# Patient Record
Sex: FEMALE | NOT HISPANIC OR LATINO | Employment: FULL TIME | URBAN - METROPOLITAN AREA
[De-identification: names, ages, dates, MRNs, and addresses within clinical notes are randomized per-mention and may not be internally consistent; named-entity substitution may affect disease eponyms.]

---

## 2024-06-20 ENCOUNTER — VIRTUAL VISIT (OUTPATIENT)
Dept: OBGYN | Facility: CLINIC | Age: 39
End: 2024-06-20

## 2024-06-20 VITALS — HEIGHT: 60 IN

## 2024-06-20 DIAGNOSIS — O09.519 SUPERVISION OF HIGH-RISK PREGNANCY OF ELDERLY PRIMIGRAVIDA: Primary | ICD-10-CM

## 2024-06-20 PROCEDURE — 99207 PR NO CHARGE NURSE ONLY: CPT | Mod: 93

## 2024-06-20 RX ORDER — PNV NO.95/FERROUS FUM/FOLIC AC 28MG-0.8MG
TABLET ORAL
COMMUNITY

## 2024-06-20 NOTE — NURSING NOTE
NPN nurse visit done over the phone. Pt will be given NPN folder and book at her upcoming appt.   Discussed optional screening available to assess chromosomal anomalies. Questions answered. Pt advised to call the clinic if she has any questions or concerns related to her pregnancy. Prenatal labs will be obtained at her upcoming appt. New prenatal visit scheduled on 7/24 with Dr. Hernández.    7w0d    Menstrual cycles: regular    Last pap: unsure. Doesn't think that she has had one.        Patient supplied answers from flow sheet for:  Prenatal OB Questionnaire.  Past Medical History  Have you ever recieved care for your mental health? : No  Have you ever been in a major accident or suffered serious trauma?: No  Within the last year, has anyone hit, slapped, kicked or otherwise hurt you?: No  In the last year, has anyone forced you to have sex when you didn't want to?: No    Past Medical History 2   Have you ever received a blood transfusion?: No  Would you accept a blood transfusion if was medically recommended?: Yes  Does anyone in your home smoke?: No   Is your blood type Rh negative?: No  Have you ever ?: No  Have you been hospitalized for a nonsurgical reason excluding normal delivery?: No  Have you ever had an abnormal pap smear?: No    Past Medical History (Continued)  Do you have a history of abnormalities of the uterus?: No  Did your mother take AUSTIN or any other hormones when she was pregnant with you?: No  Do you have any other problems we have not asked about which you feel may be important to this pregnancy?: No    Alexandra COLLINS RN

## 2024-07-02 LAB
ABO/RH(D): ABNORMAL
ANTIBODY SCREEN: POSITIVE
SPECIMEN EXPIRATION DATE: ABNORMAL

## 2024-07-03 ENCOUNTER — ANCILLARY PROCEDURE (OUTPATIENT)
Dept: ULTRASOUND IMAGING | Facility: CLINIC | Age: 39
End: 2024-07-03
Attending: OBSTETRICS & GYNECOLOGY
Payer: COMMERCIAL

## 2024-07-03 ENCOUNTER — LAB (OUTPATIENT)
Dept: LAB | Facility: CLINIC | Age: 39
End: 2024-07-03
Payer: COMMERCIAL

## 2024-07-03 DIAGNOSIS — O09.519 SUPERVISION OF HIGH-RISK PREGNANCY OF ELDERLY PRIMIGRAVIDA: ICD-10-CM

## 2024-07-03 LAB
ERYTHROCYTE [DISTWIDTH] IN BLOOD BY AUTOMATED COUNT: 12.5 % (ref 10–15)
HCT VFR BLD AUTO: 38.2 % (ref 35–47)
HGB BLD-MCNC: 12.9 G/DL (ref 11.7–15.7)
MCH RBC QN AUTO: 32 PG (ref 26.5–33)
MCHC RBC AUTO-ENTMCNC: 33.8 G/DL (ref 31.5–36.5)
MCV RBC AUTO: 95 FL (ref 78–100)
PLATELET # BLD AUTO: 288 10E3/UL (ref 150–450)
RBC # BLD AUTO: 4.03 10E6/UL (ref 3.8–5.2)
WBC # BLD AUTO: 7.5 10E3/UL (ref 4–11)

## 2024-07-03 PROCEDURE — 36415 COLL VENOUS BLD VENIPUNCTURE: CPT

## 2024-07-03 PROCEDURE — 86850 RBC ANTIBODY SCREEN: CPT

## 2024-07-03 PROCEDURE — 86803 HEPATITIS C AB TEST: CPT

## 2024-07-03 PROCEDURE — 86905 BLOOD TYPING RBC ANTIGENS: CPT

## 2024-07-03 PROCEDURE — 86901 BLOOD TYPING SEROLOGIC RH(D): CPT

## 2024-07-03 PROCEDURE — 86870 RBC ANTIBODY IDENTIFICATION: CPT

## 2024-07-03 PROCEDURE — 76801 OB US < 14 WKS SINGLE FETUS: CPT | Performed by: OBSTETRICS & GYNECOLOGY

## 2024-07-03 PROCEDURE — 87086 URINE CULTURE/COLONY COUNT: CPT

## 2024-07-03 PROCEDURE — 86900 BLOOD TYPING SEROLOGIC ABO: CPT

## 2024-07-03 PROCEDURE — 86762 RUBELLA ANTIBODY: CPT

## 2024-07-03 PROCEDURE — 87389 HIV-1 AG W/HIV-1&-2 AB AG IA: CPT

## 2024-07-03 PROCEDURE — 85027 COMPLETE CBC AUTOMATED: CPT

## 2024-07-03 PROCEDURE — 87340 HEPATITIS B SURFACE AG IA: CPT

## 2024-07-03 PROCEDURE — 86780 TREPONEMA PALLIDUM: CPT

## 2024-07-04 LAB
ANTIBODY ID: NORMAL
HBV SURFACE AG SERPL QL IA: NONREACTIVE
HCV AB SERPL QL IA: NONREACTIVE
HIV 1+2 AB+HIV1 P24 AG SERPL QL IA: NONREACTIVE
M AG RBC QL: NEGATIVE
RUBV IGG SERPL QL IA: 10.7 INDEX
RUBV IGG SERPL QL IA: POSITIVE
SPECIMEN EXPIRATION DATE: NORMAL
SPECIMEN EXPIRATION DATE: NORMAL
T PALLIDUM AB SER QL: NONREACTIVE

## 2024-07-05 LAB
BACTERIA UR CULT: NO GROWTH
BLOOD BANK CHART COMMENT: NORMAL
SPECIMEN EXPIRATION DATE: NORMAL

## 2024-07-24 ENCOUNTER — PRENATAL OFFICE VISIT (OUTPATIENT)
Dept: OBGYN | Facility: CLINIC | Age: 39
End: 2024-07-24
Payer: COMMERCIAL

## 2024-07-24 ENCOUNTER — TRANSCRIBE ORDERS (OUTPATIENT)
Dept: MATERNAL FETAL MEDICINE | Facility: CLINIC | Age: 39
End: 2024-07-24

## 2024-07-24 VITALS — DIASTOLIC BLOOD PRESSURE: 66 MMHG | BODY MASS INDEX: 28.86 KG/M2 | SYSTOLIC BLOOD PRESSURE: 104 MMHG | WEIGHT: 147 LBS

## 2024-07-24 DIAGNOSIS — O09.521 MULTIGRAVIDA OF ADVANCED MATERNAL AGE IN FIRST TRIMESTER: Primary | ICD-10-CM

## 2024-07-24 DIAGNOSIS — Z34.01 ENCOUNTER FOR SUPERVISION OF NORMAL FIRST PREGNANCY IN FIRST TRIMESTER: ICD-10-CM

## 2024-07-24 DIAGNOSIS — O26.90 PREGNANCY RELATED CONDITION, ANTEPARTUM: Primary | ICD-10-CM

## 2024-07-24 PROCEDURE — G0145 SCR C/V CYTO,THINLAYER,RESCR: HCPCS | Performed by: OBSTETRICS & GYNECOLOGY

## 2024-07-24 PROCEDURE — 36415 COLL VENOUS BLD VENIPUNCTURE: CPT | Performed by: OBSTETRICS & GYNECOLOGY

## 2024-07-24 PROCEDURE — 87624 HPV HI-RISK TYP POOLED RSLT: CPT | Performed by: OBSTETRICS & GYNECOLOGY

## 2024-07-24 PROCEDURE — 99207 PR FIRST OB VISIT: CPT | Performed by: OBSTETRICS & GYNECOLOGY

## 2024-07-24 PROCEDURE — 87491 CHLMYD TRACH DNA AMP PROBE: CPT | Performed by: OBSTETRICS & GYNECOLOGY

## 2024-07-24 PROCEDURE — 87591 N.GONORRHOEAE DNA AMP PROB: CPT | Performed by: OBSTETRICS & GYNECOLOGY

## 2024-07-24 NOTE — PROGRESS NOTES
New OB Visit  Cherry Hernandez   2024   11w6d   Needs [facility]  or spouse present as he was today.  Spouse is RN at [employer]    Subjective: Cherry Hernandez 39 year old  at 11w6d dated by LMP, early ultrasound here today for initial OB visit.Estimated Date of Delivery: 2025. Patient reports being tired. Denies cramping and vaginal spotting.     Gyn History:   Menses: LMP: Patient's last menstrual period was 2024 (exact date). frequency: q month  Sexually transmitted disease history: none.    Occupation:   Exercise: active  Diet: as tolerated  H/o Chicken Pox or Varicella Vaccination: Yes    no prior history of hypertension, DM, asthma.    History of GDM: No   Hx PTL : No   History of HTN in pregnancy: No   Shoulder dystocia: No   Vacuum Extraction: No   PPH: No   3rd of 4th degree laceration: No   Other complications: No    Since her last LMP she denies use of alcohol, tobacco and street drugs.    OBhx: never pregnant  OB History    Para Term  AB Living   1 0 0 0 0 0   SAB IAB Ectopic Multiple Live Births   0 0 0 0 0      # Outcome Date GA Lbr Florentino/2nd Weight Sex Type Anes PTL Lv   1 Current                ROS: Ten point review of systems was reviewed and negative except the above.    HISTORY:  History reviewed. No pertinent past medical history.  Past Surgical History:   Procedure Laterality Date    BREAST MASS EXCISION Right      Family History   Problem Relation Age of Onset    Hypertension Mother     Diabetes Father     No Known Problems Sister     Alzheimer Disease Maternal Grandmother      Social History     Socioeconomic History    Marital status:      Spouse name: Roderick Salazar    Number of children: None    Years of education: None    Highest education level: None   Tobacco Use    Smoking status: Never    Smokeless tobacco: Never   Substance and Sexual Activity    Alcohol use: Never    Drug use: Never    Sexual activity: Yes     Partners: Male     Current  Outpatient Medications   Medication Sig Dispense Refill    Prenatal Vit-Fe Fumarate-FA (PRENATAL VITAMIN) 27-0.8 MG TABS        No current facility-administered medications for this visit.     Allergies   Allergen Reactions    Blood Transfusion Related (Informational Only)      Patient has a history of a clinically significant antibody against RBC antigens.  A delay in compatible RBCs may occur.       Past medical, surgical, social and family history were reviewed and updated in EPIC.      EXAM:  /66   Wt 66.7 kg (147 lb)   LMP 2024 (Exact Date)   BMI 28.86 kg/m       Gen:  no acute distress, comfortable  HENT: No scleral injection or icterus  CV: Regular rate and rhythm, no murmur  Resp: CTAB, Normal work of breathing, no cough  GI: Abdomen soft, non-tender. No masses, organomegaly  Skin: No suspicious lesions or rashes  Psychiatric: mentation appears normal and affect bright   Pelvis: External genitalia within normal limits. Urethra is without lesion. Bladder is nontender.    On speculum exam, cervix is without lesion and vagina is normal without lesion or discharge. Pap smear obtained without incident. GC/Chlamydia PCR obtained  +FHT present 160      Recent Labs   Lab Test 24  1242   AS Positive*     Rhogam not indicated     Recent Labs   Lab Test 24  1242   HEPBANG Nonreactive   HIAGAB Nonreactive   RUQIGG Positive       Treponemal antibody neg    CBC RESULTS:   Recent Labs   Lab Test 24  1242   WBC 7.5   RBC 4.03   HGB 12.9   HCT 38.2   MCV 95   MCH 32.0   MCHC 33.8   RDW 12.5          ASSESSMENT:  39 year old  at 11w6d dated by LMP and early U/S here for NOB visit.    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 She has no questions.  2) EDUCATION : RECOMMENDED WEIGHT GAIN: 25-35 lbs given Body mass  index is 28.86 kg/m ..   - Instructed on best evidence for: healthy diet and foods to avoid; exercise and activity during pregnancy; and maintenance of a generally healthy lifestyle. Reviewed early pregnancy education, provider coverage, labor and delivery, and prenatal visits.  Discussed the harms, benefits, side effects and alternative therapies for current prescribed and OTC medications.  - recommend PNV  3) Discussed options for screening for chromosomal anomalies, including first screen, noninvasive prenatal testing, CVS/amniocentesis, quad screen, and ultrasound at 18-20 weeks. She is electing noninvasive prenatal testing performed today.  4) MFM referral secondary to AMA and to confirm the clinical insignificance of the anti-M antibody    Follow up in 4 weeks. She is encouraged to call sooner with questions or concerns.    Wojciech Hernández MD   Obstetrics and Gynecology

## 2024-07-24 NOTE — NURSING NOTE
"Chief Complaint   Patient presents with    Prenatal Care     NPN provider visit   11w6d  In the presence of video  # [id number],  waiver form signed.     initial /66   Wt 66.7 kg (147 lb)   LMP 05/02/2024 (Exact Date)   BMI 28.86 kg/m   Estimated body mass index is 28.86 kg/m  as calculated from the following:    Height as of 6/20/24: 1.52 m (4' 11.84\").    Weight as of this encounter: 66.7 kg (147 lb).  BP completed using cuff size regular.  Connie Child CMA    "

## 2024-07-25 LAB
C TRACH DNA SPEC QL NAA+PROBE: NEGATIVE
HPV HR 12 DNA CVX QL NAA+PROBE: NEGATIVE
HPV16 DNA CVX QL NAA+PROBE: NEGATIVE
HPV18 DNA CVX QL NAA+PROBE: NEGATIVE
HUMAN PAPILLOMA VIRUS FINAL DIAGNOSIS: NORMAL
N GONORRHOEA DNA SPEC QL NAA+PROBE: NEGATIVE

## 2024-07-28 ENCOUNTER — HEALTH MAINTENANCE LETTER (OUTPATIENT)
Age: 39
End: 2024-07-28

## 2024-07-30 LAB
BKR LAB AP GYN ADEQUACY: NORMAL
BKR LAB AP GYN INTERPRETATION: NORMAL
BKR LAB AP LMP: NORMAL
BKR LAB AP PREVIOUS ABNORMAL: NORMAL
PATH REPORT.COMMENTS IMP SPEC: NORMAL
PATH REPORT.COMMENTS IMP SPEC: NORMAL
PATH REPORT.RELEVANT HX SPEC: NORMAL

## 2024-07-31 ENCOUNTER — PATIENT OUTREACH (OUTPATIENT)
Dept: OBGYN | Facility: CLINIC | Age: 39
End: 2024-07-31
Payer: COMMERCIAL

## 2024-07-31 PROBLEM — R87.810 CERVICAL HIGH RISK HPV (HUMAN PAPILLOMAVIRUS) TEST POSITIVE: Status: ACTIVE | Noted: 2024-07-31

## 2024-08-13 LAB — SCANNED LAB RESULT: NORMAL

## 2024-08-21 ENCOUNTER — PRENATAL OFFICE VISIT (OUTPATIENT)
Dept: OBGYN | Facility: CLINIC | Age: 39
End: 2024-08-21
Payer: COMMERCIAL

## 2024-08-21 VITALS — DIASTOLIC BLOOD PRESSURE: 62 MMHG | WEIGHT: 150 LBS | BODY MASS INDEX: 29.45 KG/M2 | SYSTOLIC BLOOD PRESSURE: 102 MMHG

## 2024-08-21 DIAGNOSIS — Z34.02 ENCOUNTER FOR SUPERVISION OF NORMAL FIRST PREGNANCY IN SECOND TRIMESTER: Primary | ICD-10-CM

## 2024-08-21 PROCEDURE — 99207 PR PRENATAL VISIT: CPT | Performed by: OBSTETRICS & GYNECOLOGY

## 2024-08-21 NOTE — PROGRESS NOTES
39-year-old [place] female  1 para 0 at 15 weeks 6 days.  Spouse present and helping with interpretation today although patient's English is satisfactory.  Has an appointment in [facility] on 918 for level 2 ultrasound and discussion of anti-M antibody which appears to be clinically insignificant.  Not yet perceiving fetal movement.  Return to clinic 4 weeks

## 2024-09-06 ENCOUNTER — TELEPHONE (OUTPATIENT)
Dept: OBGYN | Facility: CLINIC | Age: 39
End: 2024-09-06

## 2024-09-06 ENCOUNTER — ANCILLARY PROCEDURE (OUTPATIENT)
Dept: ULTRASOUND IMAGING | Facility: CLINIC | Age: 39
End: 2024-09-06
Attending: OBSTETRICS & GYNECOLOGY
Payer: COMMERCIAL

## 2024-09-06 ENCOUNTER — PRENATAL OFFICE VISIT (OUTPATIENT)
Dept: OBGYN | Facility: CLINIC | Age: 39
End: 2024-09-06
Payer: COMMERCIAL

## 2024-09-06 VITALS — BODY MASS INDEX: 30.23 KG/M2 | DIASTOLIC BLOOD PRESSURE: 64 MMHG | SYSTOLIC BLOOD PRESSURE: 122 MMHG | WEIGHT: 154 LBS

## 2024-09-06 DIAGNOSIS — O46.92 SECOND TRIMESTER BLEEDING: Primary | ICD-10-CM

## 2024-09-06 DIAGNOSIS — B96.89 BV (BACTERIAL VAGINOSIS): ICD-10-CM

## 2024-09-06 DIAGNOSIS — N93.9 VAGINAL BLEEDING: Primary | ICD-10-CM

## 2024-09-06 DIAGNOSIS — N93.9 VAGINAL BLEEDING: ICD-10-CM

## 2024-09-06 DIAGNOSIS — N76.0 BV (BACTERIAL VAGINOSIS): ICD-10-CM

## 2024-09-06 LAB
CLUE CELLS: PRESENT
TRICHOMONAS, WET PREP: ABNORMAL
WBC'S/HIGH POWER FIELD, WET PREP: ABNORMAL
YEAST, WET PREP: ABNORMAL

## 2024-09-06 PROCEDURE — 87210 SMEAR WET MOUNT SALINE/INK: CPT | Performed by: OBSTETRICS & GYNECOLOGY

## 2024-09-06 PROCEDURE — 76817 TRANSVAGINAL US OBSTETRIC: CPT | Performed by: OBSTETRICS & GYNECOLOGY

## 2024-09-06 PROCEDURE — 99214 OFFICE O/P EST MOD 30 MIN: CPT | Performed by: OBSTETRICS & GYNECOLOGY

## 2024-09-06 PROCEDURE — 87491 CHLMYD TRACH DNA AMP PROBE: CPT | Performed by: OBSTETRICS & GYNECOLOGY

## 2024-09-06 PROCEDURE — 87591 N.GONORRHOEAE DNA AMP PROB: CPT | Performed by: OBSTETRICS & GYNECOLOGY

## 2024-09-06 PROCEDURE — 76816 OB US FOLLOW-UP PER FETUS: CPT | Performed by: OBSTETRICS & GYNECOLOGY

## 2024-09-06 NOTE — TELEPHONE ENCOUNTER
Pt's  calling to discuss symptoms. Pt not with . Called Thi back with  to discuss symptoms.    18w1d      Bleeding started today at about 1100. Bright red bleeding on toilet paper while urinating.  Now has small amount of bright red blood on pad. The size of a fredrick.     Denies intercourse or anything in vagina in last 48 hours.     States has had some dull pain in lower abdomen the last 3-4 days, usually in the morning that improves throughout the day. Currently denies any pain.    Denies any change in vaginal discharge and denies any odor.    Has felt some fetal movement in the past has not felt any for 2-3 days.     Appointment made for ultrasound today at 1300 with a telephone visit with on call provider to follow and discuss results.     Pt advised to Please seek care in the emergency room if you have any of the following symptoms. Heavy vaginal bleeding which is soaking a pad every hour for 2 hours in a row. Passing blood clots the size of golf balls, severe abdominal pain.    Francisca BARAJAS RN  OB/GYN [place]

## 2024-09-06 NOTE — NURSING NOTE
Cherry Hernandez is a 39 year old female who is being evaluated via a billable telephone visit.      What phone number would you like to be contacted at? [phone]  How would you like to obtain your AVS? Rita Hernandez is a 39 year old female who presents for virtual visit today for the following health issue(s):  Patient presents with:  Vag Bleeding During Pregnancy    Pt feels bleeding is very little at this time.  Ultrasound has been completed    **    VIA [place] :    PT KNOWS YOU WILL CALL HER SOMETIME TODAY.  PT GAVE VERBAL PERMISSION THAT YOU COULD SPEAK WITH HER SPOUSE 'CONSTANTIN' WITHOUT HER BEING PRESENT.          Misty Diaz LPN on 9/6/2024 at 2:37 PM

## 2024-09-06 NOTE — NURSING NOTE
"Chief Complaint   Patient presents with    Vag Bleeding During Pregnancy    Prenatal Care       Initial /64   Wt 69.9 kg (154 lb)   LMP 2024 (Exact Date)   BMI 30.23 kg/m   Estimated body mass index is 30.23 kg/m  as calculated from the following:    Height as of 24: 1.52 m (4' 11.84\").    Weight as of this encounter: 69.9 kg (154 lb).  BP completed using cuff size: regular    Questioned patient about current smoking habits.  Pt. has never smoked.          The following HM Due: NONE      Misty Diaz LPN on 2024 at 3:50 PM             "

## 2024-09-06 NOTE — PROGRESS NOTES
I spent a total of 35 minutes in the care of Thi on the day of service including 25 minutes of face-to-face time with remainder in chart review, care coordination, documentation on the day of service.    Patient is a 39-year-old P0 at 18+1 weeks gestation who presents after 3 days of bleeding vaginally.  She notes that it still bleeding.  She noted it first yesterday while wiping after going to the bathroom.  She brought in a photo of that which shows some pink to red discharge on the tissue.  She noted this was also happening this morning.  She has had 2 to 3 days of cramping and experiencing a bandlike pressure across her lower abdomen.    Review of systems for bladder and bowel are negative  Review of systems for GYN is negative for rashes itching burning, discharge, odors.    Medications:  None other than prenatal vitamins    Family history negative except father does have some situations where he bruises easily and gets bloody gums with brushing his teeth    GYN history:  She was on the birth control pill for years but then stopped it 1-1/2 years ago prior to getting pregnant.    When asked whether they have been sexually active recently  notes that they were sexually active 3 days ago.    Examination:  She is pleasant appropriate no apparent distress.  She did describes an area extending from right to left lower quadrant where she feels a cramping sensation.    External genitalia are normal no blood on the perineum  Vaginal mucosa is free of blood but there is some orange discharge consistent with small amount of blood that has been mixed in.  Careful examination of the vaginal mucosa and cervix reveals no lacerations or tears.  There is an area at the 2:00 on the cervix which does appear to be slightly of eroded  Observation of the cervical os notes a small amount of thick mucousy discharge with a scant amount of red hue to it  There is no active bleeding    Assessment:  Second trimester bleeding  temporally related to being sexually active.  She is also having coinciding cramping related to the time period of being sexually active.  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  Her  does the interpreting and suggesting that she is not spending significant amount of time on her feet such as in the kitchen or working or street doing strenuous exercise and he communicated that to his wife I explained that this could be harmful to the pregnancy.    Follow-up as needed  Suggest she have copious amounts of fluids with electrolytes such as sports shakes or Gatorade  Return as needed

## 2024-09-07 ENCOUNTER — MYC MEDICAL ADVICE (OUTPATIENT)
Dept: OBGYN | Facility: CLINIC | Age: 39
End: 2024-09-07
Payer: COMMERCIAL

## 2024-09-07 LAB
C TRACH DNA SPEC QL NAA+PROBE: NEGATIVE
N GONORRHOEA DNA SPEC QL NAA+PROBE: NEGATIVE

## 2024-09-07 RX ORDER — CLINDAMYCIN PHOSPHATE 20 MG/G
1 CREAM VAGINAL AT BEDTIME
Qty: 40 G | Refills: 0 | Status: SHIPPED | OUTPATIENT
Start: 2024-09-07

## 2024-09-09 NOTE — TELEPHONE ENCOUNTER
Call to pt with . Advised that Rx was sent for BV diagnosis.      Pt states that spotting has resolved this morning.     Pt advised to call clinic with any new vaginal bleeding or concerns.     Francisca BARAJAS RN  OB/GYN [place]

## 2024-09-10 ENCOUNTER — PRE VISIT (OUTPATIENT)
Dept: MATERNAL FETAL MEDICINE | Facility: CLINIC | Age: 39
End: 2024-09-10
Payer: COMMERCIAL

## 2024-09-18 ENCOUNTER — PRENATAL OFFICE VISIT (OUTPATIENT)
Dept: OBGYN | Facility: CLINIC | Age: 39
End: 2024-09-18
Payer: COMMERCIAL

## 2024-09-18 ENCOUNTER — OFFICE VISIT (OUTPATIENT)
Dept: MATERNAL FETAL MEDICINE | Facility: CLINIC | Age: 39
End: 2024-09-18
Attending: OBSTETRICS & GYNECOLOGY
Payer: COMMERCIAL

## 2024-09-18 ENCOUNTER — HOSPITAL ENCOUNTER (OUTPATIENT)
Dept: ULTRASOUND IMAGING | Facility: CLINIC | Age: 39
Discharge: HOME | End: 2024-09-18
Attending: OBSTETRICS & GYNECOLOGY
Payer: COMMERCIAL

## 2024-09-18 VITALS — DIASTOLIC BLOOD PRESSURE: 62 MMHG | SYSTOLIC BLOOD PRESSURE: 100 MMHG | WEIGHT: 155 LBS | BODY MASS INDEX: 30.43 KG/M2

## 2024-09-18 DIAGNOSIS — Z34.02 ENCOUNTER FOR SUPERVISION OF NORMAL FIRST PREGNANCY IN SECOND TRIMESTER: Primary | ICD-10-CM

## 2024-09-18 DIAGNOSIS — O26.90 PREGNANCY RELATED CONDITION, ANTEPARTUM: ICD-10-CM

## 2024-09-18 DIAGNOSIS — Z31.5 ENCOUNTER FOR PROCREATIVE GENETIC COUNSELING: ICD-10-CM

## 2024-09-18 DIAGNOSIS — O09.512 PRIMIGRAVIDA OF ADVANCED MATERNAL AGE IN SECOND TRIMESTER: Primary | ICD-10-CM

## 2024-09-18 PROCEDURE — 96040 HC GENETIC COUNSELING, EACH 30 MINUTES: CPT

## 2024-09-18 PROCEDURE — 76811 OB US DETAILED SNGL FETUS: CPT

## 2024-09-18 PROCEDURE — 99202 OFFICE O/P NEW SF 15 MIN: CPT | Mod: 25 | Performed by: OBSTETRICS & GYNECOLOGY

## 2024-09-18 PROCEDURE — 99207 PR PRENATAL VISIT: CPT | Performed by: OBSTETRICS & GYNECOLOGY

## 2024-09-18 PROCEDURE — 76811 OB US DETAILED SNGL FETUS: CPT | Mod: 26 | Performed by: OBSTETRICS & GYNECOLOGY

## 2024-09-18 NOTE — PROGRESS NOTES
The patient was seen for an ultrasound in the [facility] at the [facility] today.  For a detailed report of the ultrasound examination, please see the ultrasound report which can be found under the imaging tab.    If you have questions regarding today's evaluation or if we can be of further service, please contact the [facility].    Vaishnavi Miller MD  , OB/GYN  Maternal-Fetal Medicine  [phone] (Pager)

## 2024-09-18 NOTE — NURSING NOTE
"Chief Complaint   Patient presents with    Prenatal Care     19w6d       Initial LMP 2024 (Exact Date)  Estimated body mass index is 30.23 kg/m  as calculated from the following:    Height as of 24: 1.52 m (4' 11.84\").    Weight as of 24: 69.9 kg (154 lb).  BP completed using cuff size: regular    Questioned patient about current smoking habits.  Pt. has never smoked.          Feeling some flutters  Appt with MFM early today    Leslie Nelson CMA on 2024 at 11:11 AM           "

## 2024-09-18 NOTE — NURSING NOTE
Patient here for GC/L2. Patient denies pain, contractions, leaking of fluid, or bleeding. SBAR given to LISA REZA, see their note in Epic.

## 2024-09-18 NOTE — PROGRESS NOTES
[facility]  Genetic Counseling Consult    Patient:  Cherry Hernandez  Preferred Name: Cherry Lee YOB: 1985   Date of Service:  24   MRN: [id number]    Cherry Lee was seen at the [facility] for genetic consultation. The indication for genetic counseling is advanced maternal age. The patient was accompanied to this visit by their partner, Roderick.    The patient was offered an  but declined. They choose to use their partner, Roderick, to help during the session. They are aware an  is available at anytime.      IMPRESSION/ PLAN   1. Cherry Lee had genetic screening earlier in this pregnancy. Their non-invasive prenatal test was screen negative or low risk for screened conditions     2. During today's [facility] visit, Cherry Lee had a genetic counseling session only. Cherry Lee has already had genetic testing in this pregnancy. Additional screening and diagnostic testing was discussed for the gestational age and declined.    3. Since the patient chose aneuploidy screening via NIPT, quad screen is NOT recommended in the second trimester. If the patient desires screening for open neural tube defects, maternal serum AFP only is recommended, ideally between 16-18 weeks gestation.    4. Cherry Lee had a level II comprehensive anatomy ultrasound today. Please see the ultrasound report for further details.    5.  Further recommendation is to return to primary provider for continued prenatal care.    PREGNANCY HISTORY   /Parity:       Cherry Lee's pregnancy history is insignificant. This is her first pregnancy.    CURRENT PREGNANCY   Current Age: 39 year old   Age at Delivery: 39 year old  HENNY: 2025, by Last Menstrual Period                                   Gestational Age: 19w6d  This pregnancy is a single gestation.   Cherry reports some bleeding which has resolved since Clindamycin treatment for vaginal  "infection. She denies complications, illnesses, fevers, and exposure concerns. Her partner reports that he smokes, but does not smoke around Cherry. It was encouraged to avoid secondhand smoke exposure. Cherry reports she is taking prenatal vitamins.    MEDICAL HISTORY   Cherry Lee s reported medical history is not expected to impact pregnancy management or risks to fetal development.       FAMILY HISTORY   A three-generation pedigree was obtained today and is scanned under the \"Media\" tab in Epic. The family history was reported by Cherry Lee and their partner.    The reported family history is unremarkable for multiple miscarriages, stillbirths, birth defects, intellectual disabilities, known genetic conditions, cancer <50y, and consanguinity.       RISK ASSESSMENT FOR INHERITED CONDITIONS AND CARRIER SCREENING OPTIONS   Expanded carrier screening is available to screen for autosomal recessive conditions and X-linked conditions in a large list of genes. Carrier screening does not test the pregnancy but gives a risk assessment for the pregnancy and future pregnancies to have the condition. Expanded carrier screening is designed to identify carrier status for conditions that are primarily childhood or adolescent onset. Expanded carrier screening does not evaluate for adult-onset conditions such as hereditary cancer syndromes, dementia/ Alzheimer's disease, or cardiovascular disease risk factors. Additionally, expanded carrier screening is not comprehensive for all known genetic diseases or inherited conditions. Carrier screening does not test for all genetic and health conditions or risk factors.     Autosomal recessive conditions happen when a mutation has been inherited from the egg and sperm and include conditions like cystic fibrosis, thalassemia, hearing loss, spinal muscular atrophy, and more. We reviewed that when both biological parents carry a harmful genetic change in a gene associated with autosomal recessive " inheritance, each of their pregnancies has a 1 in 4 (25%) chance to be affected by that condition. X-linked conditions happen when a mutation has been inherited from the egg and include conditions like fragile X syndrome.With x-linked conditions, the specific risk generally depends on the chromosomal sex of the fetus, with XY individuals (generally male) being most severely affected.     [place] screening was reviewed. About [place] Screening    The patient does NOT have a family history of known inherited conditions. This does NOT mean the patient and/or their partner is not a carrier of a condition. Approximately 90% of couples at an increased reproductive risk for an inherited condition have no family history of that condition.     The patient nor their partner have had carrier screening previously.     The patient declined the carrier screening options. They are aware the option will remain, and they can contact us if they would like to pursue screening. See below for the more detailed information we dicussed.    Expanded carrier screening for mutations in a large panel of genes associated with autosomal recessive conditions including cystic fibrosis, spinal muscular atrophy, and others, is now available.  We discussed that expanded carrier screening is designed to identify carrier status for conditions that are primarily childhood or adolescent onset. Expanded carrier screening does not evaluate for adult-onset conditions such as hereditary cancer syndromes, dementia/ Alzheimer's disease, or cardiovascular disease risk factors. Additionally, expanded carrier screening is not comprehensive for all known genetic diseases or inherited conditions. This is a screening test, and residual carrier status risk figures will be provided to the patient after results become available. Carrier screening is not meant to diagnose the patient with a condition, and generally carriers are asymptomatic. However, certain genes  may confer increased risks for various health concerns in carriers (DMD, FMR1, etc).  Patients are encouraged to share results with their primary care providers to ensure appropriate screening. If we are notified by the performing laboratory of a variant reclassification, the patient will be contacted.   We reviewed availability of expanded carrier screening through [employer] and different panel sizes.    RISK ASSESSMENT FOR CHROMOSOME CONDITIONS   We explained that the risk for fetal chromosome abnormalities increases with maternal age. We discussed specific features of common chromosome abnormalities, including trisomy 21 (Down syndrome), trisomy 13, trisomy 18, and sex chromosome trisomies.    At age 39 at midtrimester, the risk to have a baby with Down syndrome is 1 in 98.   At age 39 at midtrimester, the risk to have a baby with any chromosome abnormality is 1 in 51.     Cherry Lee had genetic screening earlier in this pregnancy. Their non-invasive prenatal test was screen negative or low risk for screened conditions     Non-invasive prenatal testing (NIPT) results  Maternal plasma cell-free DNA testing  Screens for fetal trisomy 21, trisomy 13, trisomy 18, and sex chromosome aneuploidy  First trimester ultrasound with nuchal translucency and nasal bone assessment was not performed in this pregnancy, to our knowledge.  Cherry Lee had a [employer] test earlier in pregnancy; we reviewed the results today, which are low risk.  The NIPT did include sex chromosome aneuploidies and the result was low risk. The predicted sex is XX, which is typically female.  Given the accuracy of this test, these results greatly decrease the chance for certain fetal chromosome abnormalities  We discussed the limitations of normal NIPT results  Maternal serum AFP only to screen for open neural tube defects (after 15 weeks) was not performed in this pregnancy, to our knowledge.     GENETIC TESTING OPTIONS   Genetic testing  during a pregnancy includes screening and diagnostic procedures.      Screening tests are non-invasive which means no risk to the pregnancy and includes ultrasounds and blood work. The benefits and limitations of screening were reviewed. Screening tests provide a risk assessment (chance) specific to the pregnancy for certain fetal chromosome abnormalities but cannot definitively diagnose or exclude a fetal chromosome abnormality. Follow-up genetic counseling and consideration of diagnostic testing is recommended with any abnormal screening result. Diagnostic testing during a pregnancy is more certain and can test for more conditions. However, the tests do have a risk of miscarriage that requires careful consideration. These tests can detect fetal chromosome abnormalities with greater than 99% certainty. Results can be compromised by maternal cell contamination or mosaicism and are limited by the resolution of current genetic testing technology.     There is no screening or diagnostic test that detects all forms of birth defects or intellectual disability.     We discussed the following screening options:     Non-invasive prenatal testing (NIPT)  Also called cell-free DNA screening because it detects chromosomes from the placenta in the pregnant person's blood  Can be done any time after 10 weeks gestation  Standard recommendation for NIPT screens for trisomy 21, trisomy 18, trisomy 13, with the option of adding sex chromosome aneuploidies, without or without predicted sex  Cannot screen for open neural tube defects, maternal serum AFP after 15 weeks is recommended  New NIPT options include screening for other trisomies, microdeletion syndromes, and in some cases fetal blood antigens. Guidelines do not recommend these conditions are included in standard screening. These options have limitations and should be discussed with a genetic counselor.   However, current (2023) ACMG guidelines do recommend that screening for  one microdeletion syndrome, called 22q11.2 deletion syndrome be offered to all pregnant patients. 22q11.2 deletion syndrome has an estimated prevalence of 1 in 990 to 1 in 2148 (0.05-0.1%). Risk is not thought to increase with maternal age. Clinical features are variable but include congenital heart defects, cleft palate, developmental delays, immune system deficiencies, and hearing loss. Approximately 90% of cases are de santa (a sporadic new change in a pregnancy). Cell-free DNA screening for 22q11.2 deletion syndrome is available with the inclusion of other microdeletion syndromes. There is less data about the performance of cell-free DNA screening for more rare microdeletions and the chance for false positives or negative may be increased.  There are limitations of cell-free DNA screening in detecting microdeletions and the possibility of false positives and false negatives. Microdeletion screening was not discussed due to not included in patient's NIPT.    We discussed the following ultrasound options:    Comprehensive level II ultrasound (Fetal Anatomy Ultrasound)  Ultrasound done between 18-20 weeks gestation  Screens for major birth defects and markers for aneuploidy (like trisomy 21 and trisomy 18)  Includes looking at the fetus/baby's growth, heart, organs (stomach, kidneys), placenta, and amniotic fluid    We discussed the following diagnostic options:     Amniocentesis  Invasive diagnostic procedure done after 15 weeks gestation  The procedure collects a small sample of amniotic fluid for the purpose of chromosomal testing and/or other genetic testing  Diagnostic result; more than 99% sensitivity for fetal chromosome abnormalities  Testing for AFP in the amniotic fluid can test for open neural tube defects    It was a pleasure to be involved with Cherry Lee s care. Face-to-face time of the meeting was 30 minutes.    Chelsea Joy, GC, MS, [facility]  Board Certified and [place] Licensed Genetic Counselor  M  [facility]  Maternal Fetal Medicine  Office: [phone]  [facility]: [phone]   Fax: [phone]  [facility]

## 2024-10-16 ENCOUNTER — PRENATAL OFFICE VISIT (OUTPATIENT)
Dept: OBGYN | Facility: CLINIC | Age: 39
End: 2024-10-16
Payer: COMMERCIAL

## 2024-10-16 VITALS — SYSTOLIC BLOOD PRESSURE: 120 MMHG | WEIGHT: 163 LBS | BODY MASS INDEX: 32 KG/M2 | DIASTOLIC BLOOD PRESSURE: 62 MMHG

## 2024-10-16 DIAGNOSIS — Z34.02 ENCOUNTER FOR SUPERVISION OF NORMAL FIRST PREGNANCY IN SECOND TRIMESTER: Primary | ICD-10-CM

## 2024-10-16 PROCEDURE — 99207 PR PRENATAL VISIT: CPT | Performed by: OBSTETRICS & GYNECOLOGY

## 2024-10-16 NOTE — NURSING NOTE
"Chief Complaint   Patient presents with    Prenatal Care   23w6d  Here today with spouse helping to interpret    initial /62   Wt 73.9 kg (163 lb)   LMP 05/02/2024 (Exact Date)   BMI 32.00 kg/m   Estimated body mass index is 32 kg/m  as calculated from the following:    Height as of 6/20/24: 1.52 m (4' 11.84\").    Weight as of this encounter: 73.9 kg (163 lb).  BP completed using cuff size regular.  Connie Child CMA    "

## 2024-10-16 NOTE — PROGRESS NOTES
39-year-old G1, P0 at 23 weeks 6 days.  Bulgarian speaker with some English proficiency and  bilingual and present today.  Feels well with no obstetrical complaints or concerns.  Baby active.  Discussed seasonal vaccines which she will consider.  Return to clinic 4 weeks with Álvaro

## 2024-11-13 ENCOUNTER — PRENATAL OFFICE VISIT (OUTPATIENT)
Dept: OBGYN | Facility: CLINIC | Age: 39
End: 2024-11-13
Payer: COMMERCIAL

## 2024-11-13 VITALS — BODY MASS INDEX: 32.98 KG/M2 | SYSTOLIC BLOOD PRESSURE: 108 MMHG | DIASTOLIC BLOOD PRESSURE: 66 MMHG | WEIGHT: 168 LBS

## 2024-11-13 DIAGNOSIS — O09.519 ENCOUNTER FOR SUPERVISION OF HIGH RISK PREGNANCY DUE TO ADVANCED MATERNAL AGE IN PRIMIGRAVIDA: Primary | ICD-10-CM

## 2024-11-13 DIAGNOSIS — O99.810 ABNORMAL MATERNAL GLUCOSE TOLERANCE, ANTEPARTUM: Primary | ICD-10-CM

## 2024-11-13 LAB
ERYTHROCYTE [DISTWIDTH] IN BLOOD BY AUTOMATED COUNT: 12.9 % (ref 10–15)
GLUCOSE 1H P 50 G GLC PO SERPL-MCNC: 148 MG/DL (ref 70–129)
HCT VFR BLD AUTO: 33.4 % (ref 35–47)
HGB BLD-MCNC: 11.4 G/DL (ref 11.7–15.7)
MCH RBC QN AUTO: 32.9 PG (ref 26.5–33)
MCHC RBC AUTO-ENTMCNC: 34.1 G/DL (ref 31.5–36.5)
MCV RBC AUTO: 97 FL (ref 78–100)
PLATELET # BLD AUTO: 253 10E3/UL (ref 150–450)
RBC # BLD AUTO: 3.46 10E6/UL (ref 3.8–5.2)
T PALLIDUM AB SER QL: NONREACTIVE
WBC # BLD AUTO: 8.1 10E3/UL (ref 4–11)

## 2024-11-13 PROCEDURE — 82950 GLUCOSE TEST: CPT | Performed by: OBSTETRICS & GYNECOLOGY

## 2024-11-13 PROCEDURE — 86780 TREPONEMA PALLIDUM: CPT | Performed by: OBSTETRICS & GYNECOLOGY

## 2024-11-13 PROCEDURE — 90715 TDAP VACCINE 7 YRS/> IM: CPT | Performed by: OBSTETRICS & GYNECOLOGY

## 2024-11-13 PROCEDURE — 85027 COMPLETE CBC AUTOMATED: CPT | Performed by: OBSTETRICS & GYNECOLOGY

## 2024-11-13 PROCEDURE — 99207 PR PRENATAL VISIT: CPT | Performed by: OBSTETRICS & GYNECOLOGY

## 2024-11-13 PROCEDURE — 36415 COLL VENOUS BLD VENIPUNCTURE: CPT | Performed by: OBSTETRICS & GYNECOLOGY

## 2024-11-13 PROCEDURE — 90471 IMMUNIZATION ADMIN: CPT | Performed by: OBSTETRICS & GYNECOLOGY

## 2024-11-13 RX ORDER — DOCUSATE SODIUM 100 MG/1
100 CAPSULE, LIQUID FILLED ORAL 2 TIMES DAILY
COMMUNITY

## 2024-11-13 NOTE — PROGRESS NOTES
39-year-old  at 27 weeks 6 days here with spouse.  Feels well.  Baby very active.  GCT, CBC, Trep and Tdap today.  Denies any contractions or cramps.  Return to clinic 2 weeks

## 2024-11-15 ENCOUNTER — LAB (OUTPATIENT)
Dept: LAB | Facility: CLINIC | Age: 39
End: 2024-11-15
Payer: COMMERCIAL

## 2024-11-15 DIAGNOSIS — O99.810 ABNORMAL MATERNAL GLUCOSE TOLERANCE, ANTEPARTUM: ICD-10-CM

## 2024-11-15 LAB — GLUCOSE P FAST SERPL-MCNC: 84 MG/DL (ref 60–94)

## 2024-11-15 PROCEDURE — 82951 GLUCOSE TOLERANCE TEST (GTT): CPT

## 2024-11-15 PROCEDURE — 36415 COLL VENOUS BLD VENIPUNCTURE: CPT

## 2024-11-15 PROCEDURE — 82952 GTT-ADDED SAMPLES: CPT

## 2024-11-16 LAB
GESTATIONAL GTT 1 HR POST DOSE: 165 MG/DL (ref 60–179)
GESTATIONAL GTT 2 HR POST DOSE: 139 MG/DL (ref 60–154)
GESTATIONAL GTT 3 HR POST DOSE: 114 MG/DL (ref 60–139)

## 2024-11-27 ENCOUNTER — PRENATAL OFFICE VISIT (OUTPATIENT)
Dept: OBGYN | Facility: CLINIC | Age: 39
End: 2024-11-27
Payer: COMMERCIAL

## 2024-11-27 VITALS — BODY MASS INDEX: 33.57 KG/M2 | SYSTOLIC BLOOD PRESSURE: 112 MMHG | DIASTOLIC BLOOD PRESSURE: 70 MMHG | WEIGHT: 171 LBS

## 2024-11-27 DIAGNOSIS — O09.523 MULTIGRAVIDA OF ADVANCED MATERNAL AGE IN THIRD TRIMESTER: Primary | ICD-10-CM

## 2024-11-27 PROCEDURE — T1013 SIGN LANG/ORAL INTERPRETER: HCPCS | Performed by: OBSTETRICS & GYNECOLOGY

## 2024-11-27 PROCEDURE — 99207 PR PRENATAL VISIT: CPT | Performed by: OBSTETRICS & GYNECOLOGY

## 2024-11-27 NOTE — PROGRESS NOTES
Maldivian  facilitated today's visit    39-year-old G1, P0 at 29 weeks 6 days.  Feels well.  Passed her GTT.  Baby very active.  No contractions.  Return to clinic 2 weeks

## 2024-11-27 NOTE — NURSING NOTE
"Chief Complaint   Patient presents with    Prenatal Care   29w6d    initial /70   Wt 77.6 kg (171 lb)   LMP 05/02/2024 (Exact Date)   BMI 33.57 kg/m   Estimated body mass index is 33.57 kg/m  as calculated from the following:    Height as of 6/20/24: 1.52 m (4' 11.84\").    Weight as of this encounter: 77.6 kg (171 lb).  BP completed using cuff size regular.  Connie Child CMA    "

## 2024-12-11 ENCOUNTER — PRENATAL OFFICE VISIT (OUTPATIENT)
Dept: OBGYN | Facility: CLINIC | Age: 39
End: 2024-12-11
Payer: COMMERCIAL

## 2024-12-11 VITALS — WEIGHT: 171 LBS | SYSTOLIC BLOOD PRESSURE: 92 MMHG | DIASTOLIC BLOOD PRESSURE: 74 MMHG | BODY MASS INDEX: 33.57 KG/M2

## 2024-12-11 DIAGNOSIS — O09.523 MULTIGRAVIDA OF ADVANCED MATERNAL AGE IN THIRD TRIMESTER: Primary | ICD-10-CM

## 2024-12-11 PROCEDURE — 99207 PR PRENATAL VISIT: CPT | Performed by: OBSTETRICS & GYNECOLOGY

## 2024-12-11 PROCEDURE — T1013 SIGN LANG/ORAL INTERPRETER: HCPCS | Mod: U3 | Performed by: OBSTETRICS & GYNECOLOGY

## 2024-12-11 NOTE — PROGRESS NOTES
Mosotho  helped facilitate today's visit    39-year-old G1, P0 at 31 weeks 6 days.  Patient feels well.  Baby is very active.  No contractions.  Return to clinic 2 weeks and will consider RSV vaccination at that time

## 2024-12-11 NOTE — NURSING NOTE
"Chief Complaint   Patient presents with    Prenatal Care   31w6d    initial BP 92/74   Wt 77.6 kg (171 lb)   LMP 05/02/2024 (Exact Date)   BMI 33.57 kg/m   Estimated body mass index is 33.57 kg/m  as calculated from the following:    Height as of 6/20/24: 1.52 m (4' 11.84\").    Weight as of this encounter: 77.6 kg (171 lb).  BP completed using cuff size regular.  Connie Child CMA    "

## 2024-12-24 ENCOUNTER — PRENATAL OFFICE VISIT (OUTPATIENT)
Dept: OBGYN | Facility: CLINIC | Age: 39
End: 2024-12-24
Payer: COMMERCIAL

## 2024-12-24 VITALS — BODY MASS INDEX: 34.55 KG/M2 | DIASTOLIC BLOOD PRESSURE: 68 MMHG | WEIGHT: 176 LBS | SYSTOLIC BLOOD PRESSURE: 104 MMHG

## 2024-12-24 DIAGNOSIS — O09.523 MULTIGRAVIDA OF ADVANCED MATERNAL AGE IN THIRD TRIMESTER: Primary | ICD-10-CM

## 2024-12-24 PROCEDURE — 99207 PR PRENATAL VISIT: CPT | Performed by: OBSTETRICS & GYNECOLOGY

## 2024-12-24 PROCEDURE — 90471 IMMUNIZATION ADMIN: CPT | Performed by: OBSTETRICS & GYNECOLOGY

## 2024-12-24 PROCEDURE — 90678 RSV VACC PREF BIVALENT IM: CPT | Performed by: OBSTETRICS & GYNECOLOGY

## 2024-12-24 NOTE — PROGRESS NOTES
Latvian  facilitated today's visit    39-year-old G1, P0 at 33 weeks 5 days.  Feels well.  Just getting over a cold with some residual cough and sore throat but these are much improved.    RSV vaccination to be provided today.  Return to clinic 2 weeks